# Patient Record
Sex: FEMALE | Race: WHITE
[De-identification: names, ages, dates, MRNs, and addresses within clinical notes are randomized per-mention and may not be internally consistent; named-entity substitution may affect disease eponyms.]

---

## 2019-04-05 ENCOUNTER — HOSPITAL ENCOUNTER (EMERGENCY)
Dept: HOSPITAL 26 - MED | Age: 39
Discharge: HOME | End: 2019-04-05
Payer: COMMERCIAL

## 2019-04-05 VITALS — HEIGHT: 68 IN | WEIGHT: 124 LBS | BODY MASS INDEX: 18.79 KG/M2

## 2019-04-05 VITALS — SYSTOLIC BLOOD PRESSURE: 137 MMHG | DIASTOLIC BLOOD PRESSURE: 97 MMHG

## 2019-04-05 DIAGNOSIS — J32.9: Primary | ICD-10-CM

## 2019-04-05 DIAGNOSIS — Z98.890: ICD-10-CM

## 2019-04-05 DIAGNOSIS — Z88.5: ICD-10-CM

## 2019-04-05 NOTE — NUR
BIB SELF. COUGH, SINUS PAIN, HA, PLEURITIC CP, X2 DAYS. DENIES FEVER, NVD, SOB 
AT THIS TIME. BED IN LOW LOCKED POSITION. NO RESP DISTRESS OR LABORED BREATHING 
AT THIS TIME.

## 2019-04-05 NOTE — NUR
Patient discharged with v/s stable. Written and verbal after care instructions 
given and explained. 

Patient alert, oriented and verbalized understanding of instructions. 
Ambulatory with steady gait. All questions addressed prior to discharge. ID 
band removed. Patient advised to follow up with PMD. Rx of NAPROSYN, 
GUAIATUSSIN, CETIRIZINE, FLONASE given. Patient educated on indication of 
medication including possible reaction and side effects. Opportunity to ask 
questions provided and answered.

## 2021-05-30 ENCOUNTER — HOSPITAL ENCOUNTER (EMERGENCY)
Dept: HOSPITAL 26 - MED | Age: 41
Discharge: HOME | End: 2021-05-30
Payer: COMMERCIAL

## 2021-05-30 VITALS — SYSTOLIC BLOOD PRESSURE: 129 MMHG | DIASTOLIC BLOOD PRESSURE: 78 MMHG

## 2021-05-30 VITALS — BODY MASS INDEX: 21.07 KG/M2 | HEIGHT: 68 IN | WEIGHT: 139 LBS

## 2021-05-30 VITALS — DIASTOLIC BLOOD PRESSURE: 78 MMHG | SYSTOLIC BLOOD PRESSURE: 129 MMHG
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
## 2021-05-30 DIAGNOSIS — J45.909: ICD-10-CM

## 2021-05-30 DIAGNOSIS — S69.90XA: Primary | ICD-10-CM

## 2021-05-30 DIAGNOSIS — Z88.5: ICD-10-CM
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
## 2021-05-30 DIAGNOSIS — Z79.899: ICD-10-CM
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
## 2021-05-30 PROCEDURE — 87340 HEPATITIS B SURFACE AG IA: CPT

## 2021-05-30 PROCEDURE — 86803 HEPATITIS C AB TEST: CPT

## 2021-05-30 PROCEDURE — 99283 EMERGENCY DEPT VISIT LOW MDM: CPT

## 2021-05-30 PROCEDURE — 36415 COLL VENOUS BLD VENIPUNCTURE: CPT

## 2021-05-30 PROCEDURE — 86702 HIV-2 ANTIBODY: CPT

## 2021-05-30 PROCEDURE — 86592 SYPHILIS TEST NON-TREP QUAL: CPT

## 2021-05-30 NOTE — NUR
Patient discharged with v/s stable. Written and verbal after care instructions 
given and explained. 

Patient alert, oriented and verbalized understanding of instructions. 
Ambulatory with steady gait. All questions addressed prior to discharge. ID 
band removed. Patient advised to follow up with PMD. Rx of TRUVADA, ZOFRAN AND 
ISENTRESS given. Patient educated on indication of medication including 
possible reaction and side effects. Opportunity to ask questions provided and 
answered.

## 2021-09-05 ENCOUNTER — HOSPITAL ENCOUNTER (EMERGENCY)
Dept: HOSPITAL 26 - MED | Age: 41
Discharge: HOME | End: 2021-09-05
Payer: COMMERCIAL

## 2021-09-05 VITALS — SYSTOLIC BLOOD PRESSURE: 120 MMHG | DIASTOLIC BLOOD PRESSURE: 81 MMHG

## 2021-09-05 VITALS
WEIGHT: 135 LBS | HEIGHT: 68 IN | BODY MASS INDEX: 20.46 KG/M2 | SYSTOLIC BLOOD PRESSURE: 119 MMHG | DIASTOLIC BLOOD PRESSURE: 72 MMHG

## 2021-09-05 DIAGNOSIS — Z88.5: ICD-10-CM

## 2021-09-05 DIAGNOSIS — Z20.822: ICD-10-CM

## 2021-09-05 DIAGNOSIS — J45.909: ICD-10-CM

## 2021-09-05 DIAGNOSIS — Z79.899: ICD-10-CM

## 2021-09-05 DIAGNOSIS — J32.9: Primary | ICD-10-CM

## 2021-09-05 PROCEDURE — 99283 EMERGENCY DEPT VISIT LOW MDM: CPT

## 2021-09-05 PROCEDURE — U0003 INFECTIOUS AGENT DETECTION BY NUCLEIC ACID (DNA OR RNA); SEVERE ACUTE RESPIRATORY SYNDROME CORONAVIRUS 2 (SARS-COV-2) (CORONAVIRUS DISEASE [COVID-19]), AMPLIFIED PROBE TECHNIQUE, MAKING USE OF HIGH THROUGHPUT TECHNOLOGIES AS DESCRIBED BY CMS-2020-01-R: HCPCS

## 2021-09-05 PROCEDURE — 87804 INFLUENZA ASSAY W/OPTIC: CPT
